# Patient Record
Sex: FEMALE | Race: WHITE | NOT HISPANIC OR LATINO
[De-identification: names, ages, dates, MRNs, and addresses within clinical notes are randomized per-mention and may not be internally consistent; named-entity substitution may affect disease eponyms.]

---

## 2019-03-15 ENCOUNTER — NON-APPOINTMENT (OUTPATIENT)
Age: 39
End: 2019-03-15

## 2019-03-15 ENCOUNTER — APPOINTMENT (OUTPATIENT)
Dept: ELECTROPHYSIOLOGY | Facility: CLINIC | Age: 39
End: 2019-03-15
Payer: MEDICARE

## 2019-03-15 VITALS
BODY MASS INDEX: 25.71 KG/M2 | WEIGHT: 160 LBS | OXYGEN SATURATION: 99 % | HEART RATE: 86 BPM | DIASTOLIC BLOOD PRESSURE: 72 MMHG | SYSTOLIC BLOOD PRESSURE: 109 MMHG | HEIGHT: 66 IN

## 2019-03-15 DIAGNOSIS — M25.9 JOINT DISORDER, UNSPECIFIED: ICD-10-CM

## 2019-03-15 DIAGNOSIS — Z82.49 FAMILY HISTORY OF ISCHEMIC HEART DISEASE AND OTHER DISEASES OF THE CIRCULATORY SYSTEM: ICD-10-CM

## 2019-03-15 DIAGNOSIS — Z87.891 PERSONAL HISTORY OF NICOTINE DEPENDENCE: ICD-10-CM

## 2019-03-15 PROCEDURE — 99204 OFFICE O/P NEW MOD 45 MIN: CPT

## 2019-03-15 PROCEDURE — 93000 ELECTROCARDIOGRAM COMPLETE: CPT

## 2019-03-15 RX ORDER — CLINDAMYCIN HYDROCHLORIDE 300 MG/1
300 CAPSULE ORAL
Qty: 21 | Refills: 0 | Status: DISCONTINUED | COMMUNITY
Start: 2019-01-29

## 2019-03-15 RX ORDER — OXYCODONE AND ACETAMINOPHEN 5; 325 MG/1; MG/1
5-325 TABLET ORAL
Qty: 21 | Refills: 0 | Status: DISCONTINUED | COMMUNITY
Start: 2019-03-12

## 2019-03-15 RX ORDER — METHOCARBAMOL 750 MG/1
750 TABLET, FILM COATED ORAL
Qty: 30 | Refills: 0 | Status: DISCONTINUED | COMMUNITY
Start: 2019-03-06

## 2019-03-15 NOTE — HISTORY OF PRESENT ILLNESS
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 Admits doing well but still notes feeling of palpitations despite mexiletine use. Recently underwent 30 day CardioNet which revealed isolated apc's correlating with symptoms but no evidence of PVCs. Of note, she had an EPS performed at [facility] in 2018 where PVCs/ectopy were unable to be ablated at that time. Does note marked improvement in symptoms since being on mexiletine but does not wish to be on medication long term. Denies chest pain, SOB, syncope or near syncope. \par

## 2019-03-15 NOTE — DISCUSSION/SUMMARY
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 Admits doing well but still notes feeling of palpitations despite mexiletine use. Recently underwent 30 day CardioNet which revealed isolated apc's correlating with symptoms but no evidence of PVCs. Of note, she had an EPS performed at [facility] in 2018 where PVCs/ectopy were unable to be ablated at that time. Does note marked improvement in symptoms since being on mexiletine but does not wish to be on medication long term. Denies chest pain, SOB, syncope or near syncope. EKG today NSR without evidence of ectopy. Recommend discontinuing mexiletine and Ranexa and arrange for repeat CardioNet or 24 Hour Holter to reassess PVC/APC burden (patient preferred to wait on CardioNet unless significantly symptomatic off medications). If high burden noted, could consider possible ablation in future. However, given that patient had a negative EP study and then was placed on medications, don't expect patient to have sustained atrial arrhythmias. \par \par Sincerely,\par \par Pillo Cui MD

## 2021-07-16 ENCOUNTER — TRANSCRIPTION ENCOUNTER (OUTPATIENT)
Age: 41
End: 2021-07-16

## 2021-07-16 ENCOUNTER — APPOINTMENT (OUTPATIENT)
Dept: MAMMOGRAPHY | Facility: CLINIC | Age: 41
End: 2021-07-16
Payer: MEDICARE

## 2021-07-16 ENCOUNTER — OUTPATIENT (OUTPATIENT)
Dept: OUTPATIENT SERVICES | Facility: HOSPITAL | Age: 41
LOS: 1 days | End: 2021-07-16
Payer: MEDICARE

## 2021-07-16 DIAGNOSIS — Z00.8 ENCOUNTER FOR OTHER GENERAL EXAMINATION: ICD-10-CM

## 2021-07-16 PROCEDURE — 77063 BREAST TOMOSYNTHESIS BI: CPT

## 2021-07-16 PROCEDURE — 77063 BREAST TOMOSYNTHESIS BI: CPT | Mod: 26

## 2021-07-16 PROCEDURE — 77067 SCR MAMMO BI INCL CAD: CPT | Mod: 26

## 2021-07-16 PROCEDURE — 77067 SCR MAMMO BI INCL CAD: CPT

## 2022-05-20 ENCOUNTER — APPOINTMENT (OUTPATIENT)
Dept: ELECTROPHYSIOLOGY | Facility: CLINIC | Age: 42
End: 2022-05-20
Payer: MEDICARE

## 2022-05-20 ENCOUNTER — NON-APPOINTMENT (OUTPATIENT)
Age: 42
End: 2022-05-20

## 2022-05-20 VITALS
HEART RATE: 67 BPM | WEIGHT: 170 LBS | TEMPERATURE: 98 F | SYSTOLIC BLOOD PRESSURE: 143 MMHG | DIASTOLIC BLOOD PRESSURE: 86 MMHG | BODY MASS INDEX: 27.32 KG/M2 | HEIGHT: 66 IN | OXYGEN SATURATION: 100 %

## 2022-05-20 DIAGNOSIS — I47.1 SUPRAVENTRICULAR TACHYCARDIA: ICD-10-CM

## 2022-05-20 PROCEDURE — 99213 OFFICE O/P EST LOW 20 MIN: CPT

## 2022-05-20 PROCEDURE — 93000 ELECTROCARDIOGRAM COMPLETE: CPT

## 2022-05-20 RX ORDER — MEXILETINE HYDROCHLORIDE 150 MG/1
150 CAPSULE ORAL
Qty: 180 | Refills: 0 | Status: DISCONTINUED | COMMUNITY
Start: 2018-04-19 | End: 2022-05-20

## 2022-05-20 RX ORDER — RANOLAZINE 500 MG/1
500 TABLET, FILM COATED, EXTENDED RELEASE ORAL
Qty: 60 | Refills: 0 | Status: DISCONTINUED | COMMUNITY
Start: 2018-07-03 | End: 2022-05-20

## 2022-05-20 NOTE — HISTORY OF PRESENT ILLNESS
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 Has been doing well. Still feels occasional brief runs of palpitations. Episode come and go spontaneously and last only a few seconds in duration. Not currently on medication. Denies any other associated symptoms. Unknown causative factors as it can come on with rest. Denies chest pain, SOB, syncope or near syncope.\par \par In the past, she had undergo a  30 day CardioNet which revealed isolated apc's correlating with symptoms but no evidence of PVCs. Of note, she had an EPS performed at [facility] in 2018 where PVCs/ectopy were unable to be ablated at that time.

## 2022-05-20 NOTE — DISCUSSION/SUMMARY
[FreeTextEntry1] : Impression:\par \par 1. Palpitations: EKG performed today to assess for presence of pre excitation and reveals NSR. Hx of SVT and PVCs. Recommend undergoing 30 day CardioNet to assess for presence of tachyarrhythmias and ectopy associated with symptoms. Can consider low dose beta blockers or CCB for symptom improvement pending results. Will order an echocardiogram since one has not been done in a while; she states she might have a history of MR. \par \par Plan for 30 day CardioNet. \par \par Sincerely,\par \par Pillo Cui MD

## 2022-06-16 ENCOUNTER — OUTPATIENT (OUTPATIENT)
Dept: OUTPATIENT SERVICES | Facility: HOSPITAL | Age: 42
LOS: 1 days | End: 2022-06-16

## 2022-06-16 ENCOUNTER — APPOINTMENT (OUTPATIENT)
Dept: CV DIAGNOSITCS | Facility: HOSPITAL | Age: 42
End: 2022-06-16
Payer: MEDICARE

## 2022-06-16 DIAGNOSIS — R00.2 PALPITATIONS: ICD-10-CM

## 2022-06-16 PROCEDURE — 93306 TTE W/DOPPLER COMPLETE: CPT | Mod: 26

## 2022-07-01 ENCOUNTER — APPOINTMENT (OUTPATIENT)
Dept: ELECTROPHYSIOLOGY | Facility: CLINIC | Age: 42
End: 2022-07-01

## 2022-07-01 VITALS
BODY MASS INDEX: 27.32 KG/M2 | SYSTOLIC BLOOD PRESSURE: 117 MMHG | WEIGHT: 170 LBS | HEART RATE: 72 BPM | DIASTOLIC BLOOD PRESSURE: 77 MMHG | HEIGHT: 66 IN | OXYGEN SATURATION: 94 %

## 2022-07-01 DIAGNOSIS — I49.1 ATRIAL PREMATURE DEPOLARIZATION: ICD-10-CM

## 2022-07-01 DIAGNOSIS — R00.2 PALPITATIONS: ICD-10-CM

## 2022-07-01 DIAGNOSIS — I49.3 VENTRICULAR PREMATURE DEPOLARIZATION: ICD-10-CM

## 2022-07-01 PROCEDURE — 99214 OFFICE O/P EST MOD 30 MIN: CPT

## 2022-07-01 PROCEDURE — 93000 ELECTROCARDIOGRAM COMPLETE: CPT

## 2022-07-01 RX ORDER — TIZANIDINE 4 MG/1
4 TABLET ORAL
Qty: 30 | Refills: 0 | Status: DISCONTINUED | COMMUNITY
Start: 2022-03-08

## 2022-07-01 RX ORDER — TRIAMCINOLONE ACETONIDE 1 MG/G
0.1 PASTE DENTAL
Qty: 10 | Refills: 0 | Status: DISCONTINUED | COMMUNITY
Start: 2022-06-08

## 2022-07-01 RX ORDER — CHROMIUM 200 MCG
25 MCG TABLET ORAL
Refills: 0 | Status: ACTIVE | COMMUNITY
Start: 2022-07-01

## 2022-07-01 RX ORDER — AZITHROMYCIN 250 MG/1
250 TABLET, FILM COATED ORAL
Qty: 6 | Refills: 0 | Status: DISCONTINUED | COMMUNITY
Start: 2022-06-13

## 2022-07-01 RX ORDER — CHLORHEXIDINE GLUCONATE, 0.12% ORAL RINSE 1.2 MG/ML
0.12 SOLUTION DENTAL
Qty: 473 | Refills: 0 | Status: DISCONTINUED | COMMUNITY
Start: 2022-06-08

## 2022-07-01 RX ORDER — BACLOFEN 5 MG/1
5 TABLET ORAL
Qty: 30 | Refills: 0 | Status: DISCONTINUED | COMMUNITY
Start: 2022-05-30

## 2022-07-01 NOTE — DISCUSSION/SUMMARY
[EKG obtained to assist in diagnosis and management of assessed problem(s)] : EKG obtained to assist in diagnosis and management of assessed problem(s) [FreeTextEntry1] : Impression:\par \par 1. Palpitations: EKG performed today to assess for presence of pre excitation and reveals NSR. Hx of SVT and PVCs. Review of 30 day MCOT monitor reveals NSR with APCs and isolated PVCs. Can consider low dose beta blockers or CCB for symptom improvement. Recent ECHO reveals LVEF of 66%. She is willing to try low dose medication at this time, if she continues to feel symptomatic PVCs, may consider PVC ablation in the future. \par \par Sincerely,\par \par Pillo Cui MD

## 2022-07-01 NOTE — HISTORY OF PRESENT ILLNESS
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 Has been doing well. Still feels occasional brief runs of palpitations. Episode come and go spontaneously and last only a few seconds in duration. Not currently on medication.  Unknown causative factors as it can come on with rest. In the past, she had undergo a 30 day CardioNet which revealed isolated apc's correlating with symptoms but no evidence of PVCs. Of note, she had an EPS performed at [facility] in 2018 where PVCs/ectopy were unable to be ablated at that time. Since her last visit, she admits that she most recently felt a 5 beat run of palpitations with associated shortness of breath.  Review of 30 day Holter monitor reveals sinus rhythm with PACs and isolated PVCs.

## 2022-07-25 ENCOUNTER — APPOINTMENT (OUTPATIENT)
Dept: ORTHOPEDIC SURGERY | Facility: CLINIC | Age: 42
End: 2022-07-25

## 2022-07-28 ENCOUNTER — APPOINTMENT (OUTPATIENT)
Dept: ORTHOPEDIC SURGERY | Facility: CLINIC | Age: 42
End: 2022-07-28

## 2022-07-28 VITALS — WEIGHT: 170 LBS | BODY MASS INDEX: 27.32 KG/M2 | HEIGHT: 66 IN

## 2022-07-28 DIAGNOSIS — S83.412A SPRAIN OF MEDIAL COLLATERAL LIGAMENT OF LEFT KNEE, INITIAL ENCOUNTER: ICD-10-CM

## 2022-07-28 PROCEDURE — 99204 OFFICE O/P NEW MOD 45 MIN: CPT

## 2022-07-28 RX ORDER — NAPROXEN 500 MG/1
500 TABLET ORAL
Qty: 60 | Refills: 2 | Status: ACTIVE | COMMUNITY
Start: 2022-07-28 | End: 1900-01-01

## 2022-07-28 NOTE — DATA REVIEWED
[MRI] : MRI [Left] : left [Knee] : knee [Report was reviewed and noted in the chart] : The report was reviewed and noted in the chart [I independently reviewed and interpreted images and report] : I independently reviewed and interpreted images and report [I reviewed the films/CD and additionally noted] : I reviewed the films/CD and additionally noted

## 2022-07-29 PROBLEM — S83.412A SPRAIN OF MEDIAL COLLATERAL LIGAMENT OF LEFT KNEE, INITIAL ENCOUNTER: Status: ACTIVE | Noted: 2022-07-28

## 2022-07-29 NOTE — HISTORY OF PRESENT ILLNESS
[de-identified] : The patient is a 42 year old right hand dominant female who presents today complaining of left knee pain .\par Date of Injury/Onset: 7/8/22\par Pain:    At Rest: 8/10 \par With Activity:  8/10 \par Mechanism of injury: Pt was walking in a store and slipped in a puddle causing her to injure her knee.\par This is not a Work Related Injury being treated under Worker's Compensation.\par This is not an athletic injury occurring associated with an interscholastic or organized sports team.\par Quality of symptoms: sharp pain, burning stinging pain, clicking\par Improves with: rest \par Worse with: any increased activity, overuse\par Prior treatment: Urgent Care at [facility] or [facility]\par Prior Imaging: MRI at \par Out of work/sport: not working\par School/Sport/Position/Occupation: not working\par Additional Information: [None]\par \par

## 2022-07-29 NOTE — IMAGING
[de-identified] : The patient is a well appearing 42 year old F of their stated age.\par Patient ambulates with a normal gait.\par + straight leg raise left\par \par Effected Knee:  left                        	\par ROM:  0-145 degrees\par Lachman: Negative\par Pivot Shift: Negative\par Anterior Drawer: Negative\par Posterior Drawer / Sag:Negative\par Varus Stress 0 degrees: Stable\par Varus Stress 30 degrees: Stable\par Valgus Stress 0 degrees: Stable\par Valgus Stress 30 degrees: Stable\par Medial Vijay: Negative\par Lateral Vijay: Negative\par Patella Glide: 2+\par Patella Apprehension: Negative\par Patella Grind: Negative\par \par Palpation:\par Medial Joint Line: Nontender\par Lateral Joint Line: Nontender\par Medial Collateral Ligament: Nontender\par Lateral Collateral Ligament/PLC: tender\par Medial Femoral Condyle: Nontender\par Medial Retinaculum: Nontender\par Distal Femur: Nontender\par Proximal Tibia: Nontender\par Tibial Tubercle: Nontender\par Distal Pole Patella: Nontender\par Quadriceps Tendon: Nontender &  Intact\par Patella Tendon: Nontender &  Intact\par Medial Distal Hamstring/PES: tender\par Lateral Distal Hamstring: Nontender & Stable\par Iliotibial Band: Nontender\par Medial Patellofemoral Ligament: Nontender\par Adductor: Nontender\par Proximal GSC-Plantaris: Nontender\par Calf: Supple & Nontender\par \par Inspection:\par Deformity: No\par Erythema: No\par Ecchymosis: No\par Abrasions: No\par Effusion: No\par Prepatella Bursitis: No\par Neurologic Exam:\par Sensation L4-S1: Grossly Intact\par \par Motor Exam:\par Quadriceps: 5 out of 5\par Hamstrings: 5 out of 5\par EHL: 5 out of 5\par FHL: 5 out of 5\par TA: 5 out of 5\par GS: 5 out of 5\par Circulatory/Pulses:\par Dorsalis Pedis: 2+\par Posterior Tibialis: 2+\par Additional Pertinent Findings: None\par Contralateral Knee:                           	\par ROM: 0-145 degrees\par Other Pertinent Findings: None\par \par Assessment: The patient is a 42 year old F with left knee pain and radiographic and physical exam findings consistent with Grade I MCL sprain and lumbar radiculopathy\par  \par The patient’s condition is acute\par Documents/Results Reviewed Today: MRI left knee\par Tests/Studies Independently Interpreted Today: MRI left knee reveals inflammation of the MCL\par Pertinent findings include: +lSLR, gaps 2+ at 30deg valgus, tender MCL and distal medial hamstring\par Confounding medical conditions/concerns: none\par \par Plan: Patient will be placed in playmaker knee brace. They will obtain MRI of their lumbar spine due to radicular symptoms. Referred to Dr. Ruggiero for further treatment. \par Tests Ordered: MRI lumbar spine to eval for disc herniation\par Prescription Medications Ordered: Naprosyn\par Braces/DME Ordered: none\par Activity/Work/Sports Status: not working\par Additional Instructions: none\par Follow-Up: 4wks \par \par The patient's current medication management of their orthopedic diagnosis was reviewed today:\par (1) We discussed a comprehensive treatment plan that included possible pharmaceutical management involving the use of prescription strength medications including but not limited to options such as oral Naprosyn 500mg BID, once daily Meloxicam 15 mg, or 500-650 mg Tylenol versus over the counter oral medications and topical prescription NSAID Pennsaid vs over the counter Voltaren gel.\par (2) There is a moderate risk of morbidity with further treatment, especially from use of prescription strength medications and possible side effects of these medications which include upset stomach with oral medications, skin reactions to topical medications and cardiac/renal issues with long term use.\par (3) I recommended that the patient follow-up with their medical physician to discuss any significant specific potential issues with long term medication use such as interactions with current medications or with exacerbation of underlying medical comorbidities.\par (4) The benefits and risks associated with use of injectable, oral or topical, prescription and over the counter anti-inflammatory medications were discussed with the patient. The patient voiced understanding of the risks including but not limited to bleeding, stroke, kidney dysfunction, heart disease, and were referred to the black box warning label for further information.\par \par \par I, Tha Lloyd, attest that this documentation has been prepared under the direction and in the presence of Provider Dr. Herndon\par \par \par The documentation recorded by the scribe accurately reflects the service I personally performed and the decisions made by me.\par \par

## 2022-08-12 ENCOUNTER — RESULT REVIEW (OUTPATIENT)
Age: 42
End: 2022-08-12

## 2022-09-02 ENCOUNTER — RX CHANGE (OUTPATIENT)
Age: 42
End: 2022-09-02

## 2022-09-02 RX ORDER — METOPROLOL SUCCINATE 25 MG/1
25 TABLET, EXTENDED RELEASE ORAL DAILY
Qty: 45 | Refills: 3 | Status: ACTIVE | COMMUNITY
Start: 2022-07-01 | End: 1900-01-01

## 2022-09-08 ENCOUNTER — APPOINTMENT (OUTPATIENT)
Dept: ORTHOPEDIC SURGERY | Facility: CLINIC | Age: 42
End: 2022-09-08

## 2022-09-15 ENCOUNTER — APPOINTMENT (OUTPATIENT)
Dept: PAIN MANAGEMENT | Facility: CLINIC | Age: 42
End: 2022-09-15

## 2023-01-26 ENCOUNTER — NON-APPOINTMENT (OUTPATIENT)
Age: 43
End: 2023-01-26

## 2023-03-23 ENCOUNTER — APPOINTMENT (OUTPATIENT)
Dept: ORTHOPEDIC SURGERY | Facility: CLINIC | Age: 43
End: 2023-03-23
Payer: COMMERCIAL

## 2023-03-23 VITALS — WEIGHT: 175 LBS | BODY MASS INDEX: 28.12 KG/M2 | HEIGHT: 66 IN

## 2023-03-23 DIAGNOSIS — E78.00 PURE HYPERCHOLESTEROLEMIA, UNSPECIFIED: ICD-10-CM

## 2023-03-23 DIAGNOSIS — M17.11 UNILATERAL PRIMARY OSTEOARTHRITIS, RIGHT KNEE: ICD-10-CM

## 2023-03-23 DIAGNOSIS — M54.16 RADICULOPATHY, LUMBAR REGION: ICD-10-CM

## 2023-03-23 PROCEDURE — 99214 OFFICE O/P EST MOD 30 MIN: CPT

## 2023-03-23 PROCEDURE — 73564 X-RAY EXAM KNEE 4 OR MORE: CPT | Mod: 50

## 2023-03-24 PROBLEM — M17.11 PATELLOFEMORAL ARTHRITIS OF RIGHT KNEE: Status: ACTIVE | Noted: 2023-03-23

## 2023-03-24 NOTE — HISTORY OF PRESENT ILLNESS
[de-identified] : The patient is a 42 year old right hand dominant female who presents today complaining of bilateral knee pain .\par Date of Injury/Onset: 7/8/22\par Pain:    At Rest: 5/10 \par With Activity:  8/10 \par Mechanism of injury: Pt was walking in a store and slipped in a puddle causing her to injure her knee and pain has not resolved. She now has new c/o right knee pain as well.\par This is not a Work Related Injury being treated under Worker's Compensation.\par This is not an athletic injury occurring associated with an interscholastic or organized sports team.\par Quality of symptoms: sharp pain, burning stinging pain, clicking, catching\par Improves with: rest\par Worse with: any increased activity, overuse\par Prior treatment: PT\par Prior Imaging: no recent imaging\par Out of work/sport: not working\par School/Sport/Position/Occupation: not working\par Additional Information: had FAYE in 2012\par \par

## 2023-03-24 NOTE — IMAGING
[Bilateral] : knee bilaterally [All Views] : anteroposterior, lateral, skyline, and anteroposterior standing [Degenerative change] : Degenerative change [de-identified] : The patient is a well appearing 43 year old female of their stated age. \par Patient ambulates with a normal gait. \par POSITIVE straight leg raise left, negative straight leg raise right  \par \par Effected Knee: RIGHT 	 \par ROM:  -3-140 degrees \par Lachman: Negative \par Pivot Shift: Negative\par Anterior Drawer: Negative\par Posterior Drawer / Sag:Negative \par Varus Stress 0 degrees: Stable \par Varus Stress 30 degrees: Stable \par Valgus Stress 0 degrees: Stable\par Valgus Stress 30 degrees: Stable \par Medial Vijay: Negative\par Lateral Joel: POSITIVE \par Patella Glide: 2+ \par Patella Apprehension: Negative \par Patella Grind: POSITIVE \par  \par Palpation: \par Medial Joint Line: Nontender \par Lateral Joint Line: TENDER \par Medial Collateral Ligament: Nontender \par Lateral Collateral Ligament/PLC: Nontender \par Distal Femur: Nontender \par Proximal Tibia: Nontender \par Tibial Tubercle: Nontender \par Distal Pole Patella: Nontender \par Quadriceps Tendon: Nontender &  Intact \par Patella Tendon: Nontender &  Intact \par Medial Distal Hamstring/PES: TENDER \par Lateral Distal Hamstring: Nontender & Stable \par Iliotibial Band: Nontender \par Medial Patellofemoral Ligament: Nontender \par Adductor: Nontender \par Proximal GSC-Plantaris: Nontender \par Calf: Supple & Nontender \par  \par Inspection: \par Deformity: No \par Erythema: No \par Ecchymosis: No \par Abrasions: No \par Effusion: No \par Prepatella Bursitis: No \par Neurologic Exam: \par Sensation L4-S1: Grossly Intact \par Motor Exam: \par Quadriceps: 5 out of 5 \par Hamstrings: 5 out of 5 \par EHL: 5 out of 5 \par FHL: 5 out of 5 \par TA: 5 out of 5 \par GS: 5 out of 5 \par Circulatory/Pulses: \par Dorsalis Pedis: 2+ \par Posterior Tibialis: 2+ \par Additional Pertinent Findings: None \par \par >>>>>>>>>>>>>>>>>>>>>>>>>>>\par \par Effected Knee: LEFT 	 \par ROM:  -3-135 degrees \par Lachman: Negative \par Pivot Shift: Negative \par Anterior Drawer: Negative \par Posterior Drawer / Sag:Negative \par Varus Stress 0 degrees: Stable \par Varus Stress 30 degrees: Stable \par Valgus Stress 0 degrees: Stable \par Valgus Stress 30 degrees: Stable \par Medial Vijay: Negative \par Lateral Vijay: Negative \par Patella Glide: 2+ \par Patella Apprehension: Negative \par Patella Grind: Negative \par  \par Palpation: \par Medial Joint Line: TENDER \par Lateral Joint Line: Nontender \par Medial Collateral Ligament: TENDER, PROXIMALLY \par Lateral Collateral Ligament/PLC: Nontender \par Distal Femur: Nontender \par Proximal Tibia: Nontender \par Tibial Tubercle: Nontender \par Distal Pole Patella: Nontender \par Quadriceps Tendon: Nontender &  Intact \par Patella Tendon: Nontender &  Intact \par Medial Distal Hamstring/PES: TENDER \par Lateral Distal Hamstring: Nontender & Stable \par Iliotibial Band: Nontender \par Medial Patellofemoral Ligament: Nontender \par Adductor: Nontender \par Proximal GSC-Plantaris: Nontender \par Calf: Supple & Nontender \par Gerdy's Tubercle: TENDER \par \par Inspection:\par Deformity: No \par Erythema: No \par Ecchymosis: No \par Abrasions: No \par Effusion: No \par Prepatella Bursitis: No \par Neurologic Exam: \par Sensation L-S1: Grossly Intact \par \par Motor Exam: \par Quadriceps: 5 out of 5 \par Hamstrings: 5 out of 5 \par EHL: 5 out of 5 \par FHL: 5 out of 5 \par TA: 5 out of 5 \par GS: 5 out of 5 \par Circulatory/Pulses: \par Dorsalis Pedis: 2+\par Posterior Tibialis: 2+ \par Additional Pertinent Findings: None \par \par  \par Assessment: The patient is a 43 year old female with bilateral knee pain and radiographic and physical exam findings consistent with right knee patellofemoral OA, left knee hamstring tendonitis, and lumbar radiculopathy. \par \par The patient’s condition is acute \par Documents/Results Reviewed Today: X-Ray bilateral knee \par Tests/Studies Independently Interpreted Today: X-Ray bilateral knee reveals medial joint line narrowing L>R. 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 RIGHT: -3-140, LJLT, tender medial distal hamstring, +patellar crepitus, +lateral Vijay, \par Confounding medical conditions/concerns: Previous MCL tear left knee, resolved. Facet injections with outside pain management doctor.\par \par Plan: Patient will start physical therapy, HEP, and stretching for both knees and back. Patient will return to pain management doctor for further discussion of lumbar stenosis and injection therapy. Discussed that some knee mechanical symptoms are radicular in root.  Advised patient to obtain Reparel sleeve. Advised patient to obtain full length shoe inserts and OTC topical Voltaren gel. Discussed taking OTC antiinflammatories as needed - use as directed. Discussed possible knee injection therapy if the patient proves refractory to formal therapy and oral anti-inflammatories. Modify activity as discussed.\par Tests Ordered: None \par Prescription Medications Ordered: Discussed appropriate use of OTC anti-inflammatories and analgesics (including but not limited to Aleve, Advil, Tylenol, Motrin, Ibuprofen, Voltaren gel, etc.) \par Braces/DME Ordered: None \par Activity/Work/Sports Status: None \par Additional Instructions: Full length shoe inserts and topical Voltaren gel.\par Follow-Up: 6 weeks \par  \par The patient's current medication management of their orthopedic diagnosis was reviewed today:\par (1) We discussed a comprehensive treatment plan that included possible pharmaceutical management involving the use of prescription strength medications including but not limited to options such as oral Naprosyn 500mg BID, once daily Meloxicam 15 mg, or 500-650 mg Tylenol versus over the counter oral medications and topical prescription NSAID Pennsaid vs over the counter Voltaren gel.  Based on our extensive discussion, the patient declined prescription medication and will use over the counter Advil, Alleve, Voltaren Gel or Tylenol as directed.\par (2) There is a moderate risk of morbidity with further treatment, especially from use of prescription strength medications and possible side effects of these medications which include upset stomach with oral medications, skin reactions to topical medications and cardiac/renal issues with long term use.\par (3) I recommended that the patient follow-up with their medical physician to discuss any significant specific potential issues with long term medication use such as interactions with current medications or with exacerbation of underlying medical comorbidities.\par (4) The benefits and risks associated with use of injectable, oral or topical, prescription and over the counter anti-inflammatory medications were discussed with the patient. The patient voiced understanding of the risks including but not limited to bleeding, stroke, kidney dysfunction, heart disease, and were referred to the black box warning label for further information. \par \par Agata NORTH attest that this documentation has been prepared under the direction and in the presence of Provider Dr. Curtis Herndon. \par \par The documentation recorded by the scribe accurately reflects the services I, Dr. Curtis Herndon, personally performed and the decisions made by me.\par  [FreeTextEntry9] : X-Ray right knee reveals bipartite patella, mild patellofemoral OA, medial joint line narrowing. X-Ray left knee reveals medial joint line narrowing.

## 2023-05-04 ENCOUNTER — APPOINTMENT (OUTPATIENT)
Dept: ORTHOPEDIC SURGERY | Facility: CLINIC | Age: 43
End: 2023-05-04

## 2023-10-26 ENCOUNTER — APPOINTMENT (OUTPATIENT)
Dept: ORTHOPEDIC SURGERY | Facility: CLINIC | Age: 43
End: 2023-10-26
Payer: COMMERCIAL

## 2023-10-26 VITALS — HEIGHT: 66 IN | WEIGHT: 175 LBS | BODY MASS INDEX: 28.12 KG/M2

## 2023-10-26 DIAGNOSIS — M25.562 PAIN IN RIGHT KNEE: ICD-10-CM

## 2023-10-26 DIAGNOSIS — M76.891 OTHER SPECIFIED ENTHESOPATHIES OF RIGHT LOWER LIMB, EXCLUDING FOOT: ICD-10-CM

## 2023-10-26 DIAGNOSIS — M76.892 OTHER SPECIFIED ENTHESOPATHIES OF LEFT LOWER LIMB, EXCLUDING FOOT: ICD-10-CM

## 2023-10-26 DIAGNOSIS — M25.562 PAIN IN LEFT KNEE: ICD-10-CM

## 2023-10-26 DIAGNOSIS — M25.561 PAIN IN RIGHT KNEE: ICD-10-CM

## 2023-10-26 PROCEDURE — 99213 OFFICE O/P EST LOW 20 MIN: CPT

## 2023-10-26 RX ORDER — NAPROXEN 500 MG/1
500 TABLET ORAL
Qty: 60 | Refills: 0 | Status: ACTIVE | COMMUNITY
Start: 2023-10-26 | End: 1900-01-01

## 2023-10-27 PROBLEM — M76.891 HAMSTRING TENDINITIS OF RIGHT THIGH: Status: ACTIVE | Noted: 2023-10-26

## 2023-11-30 ENCOUNTER — APPOINTMENT (OUTPATIENT)
Dept: ORTHOPEDIC SURGERY | Facility: CLINIC | Age: 43
End: 2023-11-30